# Patient Record
Sex: FEMALE | Race: ASIAN | NOT HISPANIC OR LATINO | URBAN - METROPOLITAN AREA
[De-identification: names, ages, dates, MRNs, and addresses within clinical notes are randomized per-mention and may not be internally consistent; named-entity substitution may affect disease eponyms.]

---

## 2020-04-21 VITALS
HEIGHT: 68 IN | RESPIRATION RATE: 18 BRPM | SYSTOLIC BLOOD PRESSURE: 107 MMHG | WEIGHT: 115.08 LBS | OXYGEN SATURATION: 97 % | HEART RATE: 80 BPM | TEMPERATURE: 98 F | DIASTOLIC BLOOD PRESSURE: 73 MMHG

## 2020-04-21 LAB
ALBUMIN SERPL ELPH-MCNC: 4.4 G/DL — SIGNIFICANT CHANGE UP (ref 3.4–5)
ALP SERPL-CCNC: 52 U/L — SIGNIFICANT CHANGE UP (ref 40–120)
ALT FLD-CCNC: 28 U/L — SIGNIFICANT CHANGE UP (ref 12–42)
ANION GAP SERPL CALC-SCNC: 12 MMOL/L — SIGNIFICANT CHANGE UP (ref 9–16)
APPEARANCE UR: CLEAR — SIGNIFICANT CHANGE UP
AST SERPL-CCNC: 22 U/L — SIGNIFICANT CHANGE UP (ref 15–37)
BASOPHILS # BLD AUTO: 0.02 K/UL — SIGNIFICANT CHANGE UP (ref 0–0.2)
BASOPHILS NFR BLD AUTO: 0.2 % — SIGNIFICANT CHANGE UP (ref 0–2)
BILIRUB SERPL-MCNC: 0.6 MG/DL — SIGNIFICANT CHANGE UP (ref 0.2–1.2)
BILIRUB UR-MCNC: NEGATIVE — SIGNIFICANT CHANGE UP
BUN SERPL-MCNC: 11 MG/DL — SIGNIFICANT CHANGE UP (ref 7–23)
CALCIUM SERPL-MCNC: 9.3 MG/DL — SIGNIFICANT CHANGE UP (ref 8.5–10.5)
CHLORIDE SERPL-SCNC: 103 MMOL/L — SIGNIFICANT CHANGE UP (ref 96–108)
CO2 SERPL-SCNC: 23 MMOL/L — SIGNIFICANT CHANGE UP (ref 22–31)
COLOR SPEC: YELLOW — SIGNIFICANT CHANGE UP
CREAT SERPL-MCNC: 1 MG/DL — SIGNIFICANT CHANGE UP (ref 0.5–1.3)
DIFF PNL FLD: NEGATIVE — SIGNIFICANT CHANGE UP
EOSINOPHIL # BLD AUTO: 0.01 K/UL — SIGNIFICANT CHANGE UP (ref 0–0.5)
EOSINOPHIL NFR BLD AUTO: 0.1 % — SIGNIFICANT CHANGE UP (ref 0–6)
GLUCOSE SERPL-MCNC: 143 MG/DL — HIGH (ref 70–99)
GLUCOSE UR QL: NEGATIVE — SIGNIFICANT CHANGE UP
HCG SERPL-ACNC: <1 MIU/ML — SIGNIFICANT CHANGE UP
HCT VFR BLD CALC: 40.1 % — SIGNIFICANT CHANGE UP (ref 34.5–45)
HGB BLD-MCNC: 14.3 G/DL — SIGNIFICANT CHANGE UP (ref 11.5–15.5)
IMM GRANULOCYTES NFR BLD AUTO: 0.5 % — SIGNIFICANT CHANGE UP (ref 0–1.5)
KETONES UR-MCNC: 15 MG/DL
LEUKOCYTE ESTERASE UR-ACNC: NEGATIVE — SIGNIFICANT CHANGE UP
LIDOCAIN IGE QN: 119 U/L — SIGNIFICANT CHANGE UP (ref 73–393)
LYMPHOCYTES # BLD AUTO: 1.19 K/UL — SIGNIFICANT CHANGE UP (ref 1–3.3)
LYMPHOCYTES # BLD AUTO: 13.7 % — SIGNIFICANT CHANGE UP (ref 13–44)
MCHC RBC-ENTMCNC: 31.6 PG — SIGNIFICANT CHANGE UP (ref 27–34)
MCHC RBC-ENTMCNC: 35.7 GM/DL — SIGNIFICANT CHANGE UP (ref 32–36)
MCV RBC AUTO: 88.5 FL — SIGNIFICANT CHANGE UP (ref 80–100)
MONOCYTES # BLD AUTO: 0.38 K/UL — SIGNIFICANT CHANGE UP (ref 0–0.9)
MONOCYTES NFR BLD AUTO: 4.4 % — SIGNIFICANT CHANGE UP (ref 2–14)
NEUTROPHILS # BLD AUTO: 7.06 K/UL — SIGNIFICANT CHANGE UP (ref 1.8–7.4)
NEUTROPHILS NFR BLD AUTO: 81.1 % — HIGH (ref 43–77)
NITRITE UR-MCNC: NEGATIVE — SIGNIFICANT CHANGE UP
NRBC # BLD: 0 /100 WBCS — SIGNIFICANT CHANGE UP (ref 0–0)
PH UR: 7 — SIGNIFICANT CHANGE UP (ref 5–8)
PLATELET # BLD AUTO: 154 K/UL — SIGNIFICANT CHANGE UP (ref 150–400)
POTASSIUM SERPL-MCNC: 3.4 MMOL/L — LOW (ref 3.5–5.3)
POTASSIUM SERPL-SCNC: 3.4 MMOL/L — LOW (ref 3.5–5.3)
PROT SERPL-MCNC: 7.6 G/DL — SIGNIFICANT CHANGE UP (ref 6.4–8.2)
PROT UR-MCNC: NEGATIVE MG/DL — SIGNIFICANT CHANGE UP
RBC # BLD: 4.53 M/UL — SIGNIFICANT CHANGE UP (ref 3.8–5.2)
RBC # FLD: 11.9 % — SIGNIFICANT CHANGE UP (ref 10.3–14.5)
SODIUM SERPL-SCNC: 138 MMOL/L — SIGNIFICANT CHANGE UP (ref 132–145)
SP GR SPEC: 1.02 — SIGNIFICANT CHANGE UP (ref 1–1.03)
UROBILINOGEN FLD QL: 0.2 E.U./DL — SIGNIFICANT CHANGE UP
WBC # BLD: 8.7 K/UL — SIGNIFICANT CHANGE UP (ref 3.8–10.5)
WBC # FLD AUTO: 8.7 K/UL — SIGNIFICANT CHANGE UP (ref 3.8–10.5)

## 2020-04-21 PROCEDURE — 76856 US EXAM PELVIC COMPLETE: CPT | Mod: 26

## 2020-04-21 PROCEDURE — 76830 TRANSVAGINAL US NON-OB: CPT | Mod: 26

## 2020-04-21 PROCEDURE — 74177 CT ABD & PELVIS W/CONTRAST: CPT | Mod: 26

## 2020-04-21 PROCEDURE — 99220: CPT

## 2020-04-21 PROCEDURE — 93010 ELECTROCARDIOGRAM REPORT: CPT

## 2020-04-21 RX ORDER — MORPHINE SULFATE 50 MG/1
4 CAPSULE, EXTENDED RELEASE ORAL ONCE
Refills: 0 | Status: DISCONTINUED | OUTPATIENT
Start: 2020-04-21 | End: 2020-04-21

## 2020-04-21 RX ORDER — POTASSIUM CHLORIDE 20 MEQ
20 PACKET (EA) ORAL ONCE
Refills: 0 | Status: COMPLETED | OUTPATIENT
Start: 2020-04-21 | End: 2020-04-21

## 2020-04-21 RX ORDER — MORPHINE SULFATE 50 MG/1
2 CAPSULE, EXTENDED RELEASE ORAL ONCE
Refills: 0 | Status: DISCONTINUED | OUTPATIENT
Start: 2020-04-21 | End: 2020-04-21

## 2020-04-21 RX ORDER — FAMOTIDINE 10 MG/ML
20 INJECTION INTRAVENOUS ONCE
Refills: 0 | Status: COMPLETED | OUTPATIENT
Start: 2020-04-21 | End: 2020-04-21

## 2020-04-21 RX ORDER — KETOROLAC TROMETHAMINE 30 MG/ML
30 SYRINGE (ML) INJECTION ONCE
Refills: 0 | Status: DISCONTINUED | OUTPATIENT
Start: 2020-04-21 | End: 2020-04-21

## 2020-04-21 RX ORDER — METOCLOPRAMIDE HCL 10 MG
10 TABLET ORAL ONCE
Refills: 0 | Status: COMPLETED | OUTPATIENT
Start: 2020-04-21 | End: 2020-04-21

## 2020-04-21 RX ORDER — IOHEXOL 300 MG/ML
30 INJECTION, SOLUTION INTRAVENOUS ONCE
Refills: 0 | Status: COMPLETED | OUTPATIENT
Start: 2020-04-21 | End: 2020-04-21

## 2020-04-21 RX ORDER — SODIUM CHLORIDE 9 MG/ML
1000 INJECTION INTRAMUSCULAR; INTRAVENOUS; SUBCUTANEOUS ONCE
Refills: 0 | Status: COMPLETED | OUTPATIENT
Start: 2020-04-21 | End: 2020-04-21

## 2020-04-21 RX ORDER — PANTOPRAZOLE SODIUM 20 MG/1
40 TABLET, DELAYED RELEASE ORAL ONCE
Refills: 0 | Status: COMPLETED | OUTPATIENT
Start: 2020-04-21 | End: 2020-04-21

## 2020-04-21 RX ORDER — SODIUM CHLORIDE 9 MG/ML
1000 INJECTION, SOLUTION INTRAVENOUS
Refills: 0 | Status: DISCONTINUED | OUTPATIENT
Start: 2020-04-21 | End: 2020-04-22

## 2020-04-21 RX ORDER — HYDROMORPHONE HYDROCHLORIDE 2 MG/ML
0.5 INJECTION INTRAMUSCULAR; INTRAVENOUS; SUBCUTANEOUS ONCE
Refills: 0 | Status: DISCONTINUED | OUTPATIENT
Start: 2020-04-21 | End: 2020-04-21

## 2020-04-21 RX ADMIN — Medication 30 MILLIGRAM(S): at 20:59

## 2020-04-21 RX ADMIN — MORPHINE SULFATE 4 MILLIGRAM(S): 50 CAPSULE, EXTENDED RELEASE ORAL at 19:34

## 2020-04-21 RX ADMIN — PANTOPRAZOLE SODIUM 40 MILLIGRAM(S): 20 TABLET, DELAYED RELEASE ORAL at 23:37

## 2020-04-21 RX ADMIN — FAMOTIDINE 20 MILLIGRAM(S): 10 INJECTION INTRAVENOUS at 19:56

## 2020-04-21 RX ADMIN — HYDROMORPHONE HYDROCHLORIDE 0.5 MILLIGRAM(S): 2 INJECTION INTRAMUSCULAR; INTRAVENOUS; SUBCUTANEOUS at 23:37

## 2020-04-21 RX ADMIN — IOHEXOL 30 MILLILITER(S): 300 INJECTION, SOLUTION INTRAVENOUS at 23:36

## 2020-04-21 RX ADMIN — Medication 10 MILLIGRAM(S): at 23:37

## 2020-04-21 RX ADMIN — MORPHINE SULFATE 2 MILLIGRAM(S): 50 CAPSULE, EXTENDED RELEASE ORAL at 19:59

## 2020-04-21 RX ADMIN — Medication 20 MILLIEQUIVALENT(S): at 21:22

## 2020-04-21 RX ADMIN — SODIUM CHLORIDE 1000 MILLILITER(S): 9 INJECTION INTRAMUSCULAR; INTRAVENOUS; SUBCUTANEOUS at 19:55

## 2020-04-21 NOTE — ED CDU PROVIDER INITIAL DAY NOTE - PROGRESS NOTE DETAILS
CT A/P with non specific finding of possible early SBO vs ileus, with gastritis/colitis, pt reassessed at bedside, abd soft with TTP in the mid periumbilical region, will keep in obs for pain control, IVF, bowel rest, serial abd exam, repeat CT with oral contrast in the ED given risks of nosocomial exposure during COVID pandemic for inpt hospitalization and intervention.  Pt agrees with plan pain improved, pt tolerated po without N/V, +flatus and BM, low suspicion for SBO, s/p oral contrast, will rescan pt to r/o SBO CT A/P with non specific finding of possible early SBO vs ileus, with gastritis/colitis, pt reassessed at bedside, abd soft with TTP in the periumbilical and RLQ region, will keep in obs for pain control, IVF, bowel rest, serial abd exam, repeat CT with oral contrast in the ED given risks of nosocomial exposure during COVID pandemic for inpt hospitalization and intervention.  Pt agrees with plan pain improved, pt tolerated po without N/V, +flatus, last BM yesterday prior to arrival to ER, will rescan w oral contrast and continue current management repeat CT with oral contrast reveals SBO w wall edema and transition point around RLQ.  abd remains soft with TTP over periumbilical and RLQ region.  +flatus, no N/V, declined NGT at current time given pain under control without N/V. Will obtain COVID swab and admit to [facility] for further management

## 2020-04-21 NOTE — ED PROVIDER NOTE - PROGRESS NOTE DETAILS
patient feeling better however still c/o lower abdominal pain with mild periumbilical pain, pelvic neg for ovarian torsion, labs unremarkable, +mild lower abdominal on repeat abdominal examination, will order CT Abdomen with IV contrast and sign out to overnight team CT A/P with non specific finding of possible early SBO vs ileus, with gastritis/colitis, pt reassessed at bedside, abd soft with TTP in the mid periumbilical region, will keep in obs for pain control, IVF, bowel rest, repeat CT with oral contrast given risks of nosocomial exposure during COVID pandemic.  Pt agrees with plan

## 2020-04-21 NOTE — ED ADULT NURSE NOTE - OBJECTIVE STATEMENT
Pt presents to ED c/o sudden onset lower abdominal pain with N/V x2 hours PTA. Pt upgraded due to pain. Denies any fever/chills.

## 2020-04-21 NOTE — ED ADULT TRIAGE NOTE - CHIEF COMPLAINT QUOTE
Patient to ED with complaint of lower abdominal pain, nausea, vomiting X 2 hours.  LMP Easter Sunday.  Appears un comfortable

## 2020-04-21 NOTE — ED PROVIDER NOTE - CLINICAL SUMMARY MEDICAL DECISION MAKING FREE TEXT BOX
acute onset of lower abdominal pain x 2 hours ago with vomiting after eating eggs with vegetables, appears uncomfortable in pain upon initial evaluation, normal vitals, +right lower abd tenderness, basic labs, upreg, pelvic US r/o ovarian torsion ordered, analgesia prn, IVF, UA. consider CT abdomen if pelvic US unremarkable.

## 2020-04-21 NOTE — ED CDU PROVIDER INITIAL DAY NOTE - OBJECTIVE STATEMENT
45 yo female with no sig pmhx presents c/o acute onset generalized abdominal pain described as severe cramps x 2 hours, poorly localized with associated nausea and 2 episodes of NBNB occurred 30 minutes after eating eggs with vegetables. no diarrhea/fever/cough/chest pain or dyspnea. no flank pain or urinary symptoms. no vaginal discharge. LMP 1 week ago. Sexually active with 1 partner x 3 years, no hx STDs.

## 2020-04-21 NOTE — ED PROVIDER NOTE - PHYSICAL EXAMINATION
CONSTITUTIONAL: Well-developed; well-nourished; appears uncomfortable  due to pain  	SKIN: Skin is warm and dry, no acute rash.  	HEAD: Normocephalic; atraumatic.  	EYES: PERRL, EOM intact; conjunctiva and sclera clear.  	ENT: No nasal discharge; airway clear.  no tonsillar swelling or exudates, uvula midline, airway patent  	NECK: Supple; non tender.  	CARD: S1, S2 normal; no murmurs, gallops, or rubs. Regular rate and rhythm.  	RESP: No wheezes, rales or rhonchi.  	ABD: soft; non-distended; +lower abdominal tenderness. no guarding or rebound. no cvat bilaterally  Pelvic: no vag discharge or lesions. +mild right adnexal tenderness, no CMT or masses palpated.   	EXT: Normal ROM. No clubbing, cyanosis or edema.  	NEURO: Alert, oriented. Grossly unremarkable.  PSYCH: Cooperative, appropriate.

## 2020-04-21 NOTE — ED PROVIDER NOTE - OBJECTIVE STATEMENT
45 yo female with no sig pmhx presents c/o acute onset generalized abdominal pain described as severe cramps x 2 hours, poorly localized with associated nausea and 2 episodes of NBNB occurred 30 minutes after eating eggs with vegetables. no diarrhea/fever/cough/chest pain or dyspnea. no flank pain or urinary symptoms. no vaginal discharge. LMP 1 week ago. 43 yo female with no sig pmhx presents c/o acute onset generalized abdominal pain described as severe cramps x 2 hours, poorly localized with associated nausea and 2 episodes of NBNB occurred 30 minutes after eating eggs with vegetables. no diarrhea/fever/cough/chest pain or dyspnea. no flank pain or urinary symptoms. no vaginal discharge. LMP 1 week ago. Sexually active with 1 partner x 3 years, no hx STDs.

## 2020-04-21 NOTE — ED PROVIDER NOTE - ATTENDING CONTRIBUTION TO CARE
pt was seen by previous team, s/o to night team pending US and CT, and found to have possible sbo vs ileus.  given history and lack of prior abd surg, more c/w ?viral ileus vs sbo, will PO challenge overnight and consider rpt CT in the morning, reassess

## 2020-04-22 ENCOUNTER — INPATIENT (INPATIENT)
Facility: HOSPITAL | Age: 45
LOS: 1 days | Discharge: HOME | DRG: 336 | End: 2020-04-24
Attending: SURGERY | Admitting: SURGERY
Payer: MEDICAID

## 2020-04-22 LAB
ANION GAP SERPL CALC-SCNC: 12 MMOL/L — SIGNIFICANT CHANGE UP (ref 5–17)
APTT BLD: 30.5 SEC — SIGNIFICANT CHANGE UP (ref 27.5–36.3)
BUN SERPL-MCNC: 8 MG/DL — SIGNIFICANT CHANGE UP (ref 7–23)
CALCIUM SERPL-MCNC: 8.4 MG/DL — SIGNIFICANT CHANGE UP (ref 8.4–10.5)
CHLORIDE SERPL-SCNC: 102 MMOL/L — SIGNIFICANT CHANGE UP (ref 96–108)
CO2 SERPL-SCNC: 19 MMOL/L — LOW (ref 22–31)
CREAT SERPL-MCNC: 0.64 MG/DL — SIGNIFICANT CHANGE UP (ref 0.5–1.3)
GLUCOSE SERPL-MCNC: 152 MG/DL — HIGH (ref 70–99)
GRAM STN FLD: SIGNIFICANT CHANGE UP
HCT VFR BLD CALC: 37.1 % — SIGNIFICANT CHANGE UP (ref 34.5–45)
HGB BLD-MCNC: 13.1 G/DL — SIGNIFICANT CHANGE UP (ref 11.5–15.5)
INR BLD: 1.03 — SIGNIFICANT CHANGE UP (ref 0.88–1.16)
LACTATE SERPL-SCNC: 0.8 MMOL/L — SIGNIFICANT CHANGE UP (ref 0.4–2)
MAGNESIUM SERPL-MCNC: 1.9 MG/DL — SIGNIFICANT CHANGE UP (ref 1.6–2.6)
MCHC RBC-ENTMCNC: 31.6 PG — SIGNIFICANT CHANGE UP (ref 27–34)
MCHC RBC-ENTMCNC: 35.3 GM/DL — SIGNIFICANT CHANGE UP (ref 32–36)
MCV RBC AUTO: 89.4 FL — SIGNIFICANT CHANGE UP (ref 80–100)
NRBC # BLD: 0 /100 WBCS — SIGNIFICANT CHANGE UP (ref 0–0)
PHOSPHATE SERPL-MCNC: 1.8 MG/DL — LOW (ref 2.5–4.5)
PLATELET # BLD AUTO: 132 K/UL — LOW (ref 150–400)
POTASSIUM SERPL-MCNC: 3.7 MMOL/L — SIGNIFICANT CHANGE UP (ref 3.5–5.3)
POTASSIUM SERPL-SCNC: 3.7 MMOL/L — SIGNIFICANT CHANGE UP (ref 3.5–5.3)
PROTHROM AB SERPL-ACNC: 11.5 SEC — SIGNIFICANT CHANGE UP (ref 10–12.9)
RBC # BLD: 4.15 M/UL — SIGNIFICANT CHANGE UP (ref 3.8–5.2)
RBC # FLD: 11.5 % — SIGNIFICANT CHANGE UP (ref 10.3–14.5)
SARS-COV-2 RNA SPEC QL NAA+PROBE: SIGNIFICANT CHANGE UP
SODIUM SERPL-SCNC: 133 MMOL/L — LOW (ref 135–145)
SPECIMEN SOURCE: SIGNIFICANT CHANGE UP
WBC # BLD: 8.69 K/UL — SIGNIFICANT CHANGE UP (ref 3.8–10.5)
WBC # FLD AUTO: 8.69 K/UL — SIGNIFICANT CHANGE UP (ref 3.8–10.5)

## 2020-04-22 PROCEDURE — 99217: CPT

## 2020-04-22 PROCEDURE — 88304 TISSUE EXAM BY PATHOLOGIST: CPT | Mod: 26

## 2020-04-22 PROCEDURE — 74176 CT ABD & PELVIS W/O CONTRAST: CPT | Mod: 26

## 2020-04-22 PROCEDURE — 74018 RADEX ABDOMEN 1 VIEW: CPT | Mod: 26

## 2020-04-22 RX ORDER — HYDROMORPHONE HYDROCHLORIDE 2 MG/ML
0.25 INJECTION INTRAMUSCULAR; INTRAVENOUS; SUBCUTANEOUS EVERY 4 HOURS
Refills: 0 | Status: DISCONTINUED | OUTPATIENT
Start: 2020-04-22 | End: 2020-04-24

## 2020-04-22 RX ORDER — HEPARIN SODIUM 5000 [USP'U]/ML
5000 INJECTION INTRAVENOUS; SUBCUTANEOUS EVERY 8 HOURS
Refills: 0 | Status: DISCONTINUED | OUTPATIENT
Start: 2020-04-22 | End: 2020-04-24

## 2020-04-22 RX ORDER — MAGNESIUM SULFATE 500 MG/ML
1 VIAL (ML) INJECTION ONCE
Refills: 0 | Status: COMPLETED | OUTPATIENT
Start: 2020-04-22 | End: 2020-04-22

## 2020-04-22 RX ORDER — HYDROMORPHONE HYDROCHLORIDE 2 MG/ML
0.5 INJECTION INTRAMUSCULAR; INTRAVENOUS; SUBCUTANEOUS EVERY 4 HOURS
Refills: 0 | Status: DISCONTINUED | OUTPATIENT
Start: 2020-04-22 | End: 2020-04-24

## 2020-04-22 RX ORDER — HYDROMORPHONE HYDROCHLORIDE 2 MG/ML
0.25 INJECTION INTRAMUSCULAR; INTRAVENOUS; SUBCUTANEOUS
Refills: 0 | Status: DISCONTINUED | OUTPATIENT
Start: 2020-04-22 | End: 2020-04-22

## 2020-04-22 RX ORDER — ONDANSETRON 8 MG/1
4 TABLET, FILM COATED ORAL ONCE
Refills: 0 | Status: COMPLETED | OUTPATIENT
Start: 2020-04-22 | End: 2020-04-22

## 2020-04-22 RX ORDER — ACETAMINOPHEN 500 MG
1000 TABLET ORAL ONCE
Refills: 0 | Status: COMPLETED | OUTPATIENT
Start: 2020-04-22 | End: 2020-04-22

## 2020-04-22 RX ORDER — POTASSIUM PHOSPHATE, MONOBASIC POTASSIUM PHOSPHATE, DIBASIC 236; 224 MG/ML; MG/ML
15 INJECTION, SOLUTION INTRAVENOUS ONCE
Refills: 0 | Status: COMPLETED | OUTPATIENT
Start: 2020-04-22 | End: 2020-04-22

## 2020-04-22 RX ORDER — ONDANSETRON 8 MG/1
4 TABLET, FILM COATED ORAL EVERY 6 HOURS
Refills: 0 | Status: DISCONTINUED | OUTPATIENT
Start: 2020-04-22 | End: 2020-04-24

## 2020-04-22 RX ORDER — DEXTROSE MONOHYDRATE, SODIUM CHLORIDE, AND POTASSIUM CHLORIDE 50; .745; 4.5 G/1000ML; G/1000ML; G/1000ML
1000 INJECTION, SOLUTION INTRAVENOUS
Refills: 0 | Status: DISCONTINUED | OUTPATIENT
Start: 2020-04-22 | End: 2020-04-24

## 2020-04-22 RX ADMIN — Medication 400 MILLIGRAM(S): at 09:00

## 2020-04-22 RX ADMIN — ONDANSETRON 4 MILLIGRAM(S): 8 TABLET, FILM COATED ORAL at 04:46

## 2020-04-22 RX ADMIN — Medication 100 GRAM(S): at 10:57

## 2020-04-22 RX ADMIN — Medication 62.5 MILLIMOLE(S): at 18:00

## 2020-04-22 RX ADMIN — ONDANSETRON 4 MILLIGRAM(S): 8 TABLET, FILM COATED ORAL at 09:19

## 2020-04-22 RX ADMIN — DEXTROSE MONOHYDRATE, SODIUM CHLORIDE, AND POTASSIUM CHLORIDE 125 MILLILITER(S): 50; .745; 4.5 INJECTION, SOLUTION INTRAVENOUS at 17:04

## 2020-04-22 RX ADMIN — SODIUM CHLORIDE 1000 MILLILITER(S): 9 INJECTION, SOLUTION INTRAVENOUS at 04:02

## 2020-04-22 RX ADMIN — DEXTROSE MONOHYDRATE, SODIUM CHLORIDE, AND POTASSIUM CHLORIDE 125 MILLILITER(S): 50; .745; 4.5 INJECTION, SOLUTION INTRAVENOUS at 04:13

## 2020-04-22 RX ADMIN — HEPARIN SODIUM 5000 UNIT(S): 5000 INJECTION INTRAVENOUS; SUBCUTANEOUS at 23:00

## 2020-04-22 RX ADMIN — POTASSIUM PHOSPHATE, MONOBASIC POTASSIUM PHOSPHATE, DIBASIC 63.75 MILLIMOLE(S): 236; 224 INJECTION, SOLUTION INTRAVENOUS at 12:01

## 2020-04-22 RX ADMIN — SODIUM CHLORIDE 200 MILLILITER(S): 9 INJECTION, SOLUTION INTRAVENOUS at 00:03

## 2020-04-22 NOTE — PROGRESS NOTE ADULT - SUBJECTIVE AND OBJECTIVE BOX
Vascular Surgery Post-Op Note, PCN:     Pre-Op Dx: ABDOMINAL PAIN  Small bowel obstruction  SBO (small bowel obstruction)  Abdominal pain    Procedure: Laparoscopic lysis of intestinal adhesions      Surgeon: Dr. Harley    Patient was seen and examined at bedside. No complaints. Denies SOB, chest or abdominal pain. No n/v.         T(C): 37 (20 @ 16:15), Max: 37 (20 @ 16:15)  HR: 69 (20 @ 18:00) (60 - 78)  BP: 106/59 (20 @ 18:00) (97/61 - 128/84)  RR: 16 (20 @ 18:00) (12 - 19)  SpO2: 100% (20 @ 18:00) (96% - 100%)  Vital Signs Last 24 Hrs  T(C): 37 (2020 16:15), Max: 37 (2020 16:15)  T(F): 98.6 (2020 16:15), Max: 98.6 (2020 16:15)  HR: 69 (2020 18:00) (60 - 78)  BP: 106/59 (2020 18:00) (97/61 - 128/84)  BP(mean): 81 (2020 18:00) (66 - 87)  RR: 16 (2020 18:00) (12 - 19)  SpO2: 100% (2020 18:00) (96% - 100%)    Physical Exam:  General: NAD, resting comfortably in bed  Pulmonary: Nonlabored breathing, no respiratory distress  Cardiovascular: NSR  Abdominal: soft, NT/ND incision sites with no signs of hematoma or bleeding. No discharge.   Extremities: WWP, normal strength  Neuro: A/O x 3, CNs II-XII grossly intact, normal motor/sensation, no focal deficits      LABS:                        13.1   8.69  )-----------( 132      ( 2020 09:37 )             37.1     -    133<L>  |  102  |  8   ----------------------------<  152<H>  3.7   |  19<L>  |  0.64    Ca    8.4      2020 09:37  Phos  1.8     04-22  Mg     1.9     -22    TPro  7.6  /  Alb  4.4  /  TBili  0.6  /  DBili  x   /  AST  22  /  ALT  28  /  AlkPhos  52  04-21    PT/INR - ( 2020 04:13 )   PT: 11.5 sec;   INR: 1.03          PTT - ( 2020 04:13 )  PTT:30.5 sec  CAPILLARY BLOOD GLUCOSE        LIVER FUNCTIONS - ( 2020 19:44 )  Alb: 4.4 g/dL / Pro: 7.6 g/dL / ALK PHOS: 52 U/L / ALT: 28 U/L / AST: 22 U/L / GGT: x           Urinalysis Basic - ( 2020 21:10 )    Color: Yellow / Appearance: Clear / S.025 / pH: x  Gluc: x / Ketone: 15 mg/dL  / Bili: NEGATIVE / Urobili: 0.2 E.U./dL   Blood: x / Protein: NEGATIVE mg/dL / Nitrite: NEGATIVE   Leuk Esterase: NEGATIVE / RBC: x / WBC x   Sq Epi: x / Non Sq Epi: x / Bacteria: x        Radiology and Additional Studies:    Assessment:44y Female s/p above procedure    Plan:  Pain/nausea control PRN  Home meds  Incentive spirometer/OOB/Ambulate  Vitals per protocol  IVF   TOV  AM labs  NG tube

## 2020-04-22 NOTE — H&P ADULT - NSHPLABSRESULTS_GEN_ALL_CORE
LABS:                        14.3   8.70  )-----------( 154      ( 2020 19:44 )             40.1     -    138  |  103  |  11  ----------------------------<  143<H>  3.4<L>   |  23  |  1.00    Ca    9.3      2020 19:44    TPro  7.6  /  Alb  4.4  /  TBili  0.6  /  DBili  x   /  AST  22  /  ALT  28  /  AlkPhos  52  04-21    PT/INR - ( 2020 04:13 )   PT: 11.5 sec;   INR: 1.03          PTT - ( 2020 04:13 )  PTT:30.5 sec  Urinalysis Basic - ( 2020 21:10 )    Color: Yellow / Appearance: Clear / S.025 / pH: x  Gluc: x / Ketone: 15 mg/dL  / Bili: NEGATIVE / Urobili: 0.2 E.U./dL   Blood: x / Protein: NEGATIVE mg/dL / Nitrite: NEGATIVE   Leuk Esterase: NEGATIVE / RBC: x / WBC x   Sq Epi: x / Non Sq Epi: x / Bacteria: x        RADIOLOGY & ADDITIONAL STUDIES:  CT Abdomen and Pelvis w/ IV Cont:   EXAM:  CT ABDOMEN AND PELVIS IC                           PROCEDURE DATE:  2020          INTERPRETATION:  CT of the abdomen and pelvis with contrast dated 2020 10:52 PM    INDICATION: lower abdominal pain, nausea and vomiting.    TECHNIQUE: CT of the abdomen and pelvis was performed using oral and intravenous contrast.  Axial and coronal images were produced and reviewed.    PRIOR STUDIES: None.    FINDINGS: Images of the lower chest demonstrate dependent atelectasis.    Subcentimeter hepatic hypodensities, too small to characterize. No radiopaque stones are seen in the gallbladder.  The pancreas is normal in appearance.  No splenic abnormalities are seen.    The adrenal glands are unremarkable. The kidneys are normal in appearance.        No abdominal aortic aneurysm is seen. No lymphadenopathy is seen.     Evaluation of the bowel is somewhat limited due to lack of oral contrast. Gastric antrum wall thickening suspicious for gastritis. Underdistention versus wall thickening from the hepatic flexure to the distal transverse colon consistent with mild nonspecific colitis of infectious or inflammatory etiologies. Normal appendix. Focally dilated loops of small bowel in the pelvis with air-fluid levels measuring up to 2.8 cmdemonstrating fecalization. There is suggestion of a transition point in the distal small bowel image 27 series 601. No ascites is seen.    Images of the pelvis demonstrate the uterus and adnexae to be normal in appearance.  Underdistention versus wall thickening of the urinary bladder, correlation with urinalysis is recommended.    Evaluation of the osseous structures demonstrates limbus vertebrae at the anteroinferior corner of L3 vertebral body. Mild posterior arthritis of the hips with possible mild dysplasia.    IMPRESSION:  Focally dilated loops of distal small bowel in the pelvis with fecalization and suggestion of a transition point. Findings could represent early distal small bowel obstruction versus localized small bowel ileus. Recommend a follow-up CT with oral contrast in a few hours.    Wall thickening of the gastric antrum and transverse colon suspicious for gastritis and colitis which may be further evaluated at the time of repeat CT with oral contrast.                Thankyou for the opportunity to participate in the care of this patient.        MIGUEL GARCIA M.D., ATTENDING RADIOLOGIST  This document has been electronically signed.  2020 10:53PM               (20 @ 22:52)

## 2020-04-22 NOTE — H&P ADULT - ASSESSMENT
44F w/ no PSHx p/w abdominal pain 2/2 ileus v parial SBO    NPO/IVF  Pain/nausea control  OOBA/IS/SCDs 44F w/ no PSHx p/w abdominal pain 2/2 ileus v parial SBO    NPO/IVF  Pain/nausea control  OOBA/IS/SCDs  Will discuss OR for dx lap 44F w/ no PSHx p/w abdominal pain 2/2 ileus v SBO of unknown etiology    NPO/IVF  Pain/nausea control  OOBA/IS/SCDs  Will discuss OR for dx lap

## 2020-04-22 NOTE — ED CDU PROVIDER DISPOSITION NOTE - CLINICAL COURSE
43 yo female with no sig pmhx or pshx presents c/o acute onset generalized abdominal pain described as severe cramps x 2 hours, poorly localized with associated nausea and 2 episodes of NBNB occurred 30 minutes after eating eggs with vegetables. no diarrhea/fever/cough/chest pain or dyspnea. no flank pain or urinary symptoms. no vaginal discharge. LMP 1 week ago. Sexually active with 1 partner x 3 years, no hx STDs. labs unremarkable, serial abd exam with persistent pain, TVUS/pelvis unremarkable, CT with IV contrast and later repeated with PO contrast w SBO, no oral contrast has not progressed into the region of the distal small bowel, there is again distended distal ileal bowel loops within the lower abdomen and right lower quadrant. Probable transition point within the region of the right lower quadrant. Findings are consistent with distal small bowel obstruction. Pt has been kept NPO, requiring multiple doses of IV analgesic meds, recommended NGT but currently refused due to no active N/V.  Pt reports no prior h/o EGD/C-scopy.  Case discussed with ACS on call, Dr. Santamaria, accepted to regional surgery at [facility]

## 2020-04-22 NOTE — CHART NOTE - NSCHARTNOTEFT_GEN_A_CORE
Called to bedside as patient stated her abdominal pain was worsening. Upon arrival I was told her pain is worse and she is nauseous. Patient began to force herself to gag into her bucket. Discussion was had about NG tube placement and patient was not amenable to this intervention even though the benefits were clearly explained to her. She stated she has been having so much pain that she has not been able to sleep and if she could sleep she would feel better. She also states she isn't passing flatus. Abdominal exam is unchanged, she is soft, minimally distended and nontender.

## 2020-04-22 NOTE — ED ADULT NURSE REASSESSMENT NOTE - NS ED NURSE REASSESS COMMENT FT1
Pt reports some relief in pain s/p medication administration. Pt drinking oral contrast for repeat CT. Pt remains in bed, in NAD, vital signs WNL. Will continue to monitor.
Pt sleeping on stretcher. Pt states she finished drinking OC at approx 12:30am. Pt awaiting CT scan with contrast. Will continue to monitor. Side rails up, safety is maintained.

## 2020-04-22 NOTE — H&P ADULT - HISTORY OF PRESENT ILLNESS
44F w/ no PMHx p/w 1 day of diffuse abdominal pain with associated nausea/vomiting, felt better after emesis, endorses BM yesterday and passing gas en route to the hospital.

## 2020-04-22 NOTE — H&P ADULT - NSCORESITESY/N_GEN_A_CORE_RD
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 Axel Salinas.     The pt is need of having the spleenectomy protocol for vaccinations.  The pt no longer resides at [facility].     Per Dr. Khan office recommends vaccinations to be done 3-4 weeks after splenectomy (8-16-19).    Splenectomy vaccines  -Give 3-4 weeks after surgery at PCP office or prepare department  -Covers encapsulated organisms: Pneumococcal, Haemophilus Influenza Type B and Meningococcal:    Pneumococcal (PCV13 & Pneumovax 23)  -Give PCV13 first if none prior, only one lifetime dose needed  -If patient has had or was given PCV13, and is due for Pneumovax 23, give the Pneumovax 23 at least 8 weeks after PCV13. Do not give PCV13 and Pneumovax 23 at the same time.   -If the patient recently had the pneumovax 23 but not the PCV 13 in their lifetime, give PCV 13 at least one year after last pneumovax 23  -Pneumovax 23 booster every 5 years max of 2 doses below age 65 for those >18 y/o  -Only need one dose of Pneumovax 23 after age 65 and max of 3 lifetime doses.     Haemophilus Influenza type B (Hib)   -One dose in a lifetime, do not give if already received    Meningococcal (MCV4 Menveo & MenB series)  -MCV4 Menveo one dose now and another dose in 8 weeks if no two dose series prior.   -Will need a booster of MCV4 Menveo every 5 years with PCP office.  -MenB Bexsero Vaccine 2 dose series, 4 weeks a part if no prior 2 dose series, no boosters needed    -In addition patient should have yearly influenza flu vaccine during the flu season.     Pt received pneumonococcal 23 on 10- and pneumovac 13 on 5-31-16    RN called over to Dr. Axel Salinas's office to see if they would be able to order the necessary immunizations to allow the pt to get her vaccinations at [facility] in [place] since pt lives in [place] and would make it easier since they are on the same system and Dr. Salinas performed the splenectomy on  8-16-19.    Per Dr. Khan office they don't ever do the orders for the vaccinations, only send out recommendations to the PCP. She will send a copy to the [facility] to have as a reference.    Please advise on vaccinations. Will need orders placed, signed and faxed over to art in [place]. Please send back to [place].    Thanks.                  No

## 2020-04-22 NOTE — H&P ADULT - NSHPPHYSICALEXAM_GEN_ALL_CORE
Vital Signs Last 24 Hrs  T(C): 36.9 (22 Apr 2020 07:01), Max: 36.9 (22 Apr 2020 01:32)  T(F): 98.5 (22 Apr 2020 07:01), Max: 98.5 (22 Apr 2020 01:32)  HR: 73 (22 Apr 2020 07:01) (60 - 80)  BP: 128/84 (22 Apr 2020 07:01) (102/61 - 128/84)  BP(mean): --  RR: 12 (22 Apr 2020 07:01) (12 - 18)  SpO2: 99% (22 Apr 2020 07:01) (96% - 99%)    General: NAD, resting comfortably in bed  C/V: NSR  Pulm: Nonlabored breathing, no respiratory distress  Abd: softly distended, nontender, no rebound/gaurding  Extrem: WWP, no edema

## 2020-04-22 NOTE — BRIEF OPERATIVE NOTE - OPERATION/FINDINGS
Diagnostic laparoscopy performed revealing single culprit adhesion in pelvis w/ dilated loops of bowel proximally, decompressed loops distally, and hyperemic bowel trapped by adhesion. Adhesion lysed w/ immediate improvement in caliber and color of bowel. No other adhesions identified. Fascia at 12mm port site closed w/ 0-Vicryl suture.

## 2020-04-23 LAB
ANION GAP SERPL CALC-SCNC: 10 MMOL/L — SIGNIFICANT CHANGE UP (ref 5–17)
BUN SERPL-MCNC: 4 MG/DL — LOW (ref 7–23)
CALCIUM SERPL-MCNC: 8.3 MG/DL — LOW (ref 8.4–10.5)
CHLORIDE SERPL-SCNC: 106 MMOL/L — SIGNIFICANT CHANGE UP (ref 96–108)
CO2 SERPL-SCNC: 23 MMOL/L — SIGNIFICANT CHANGE UP (ref 22–31)
CREAT SERPL-MCNC: 0.67 MG/DL — SIGNIFICANT CHANGE UP (ref 0.5–1.3)
GLUCOSE SERPL-MCNC: 112 MG/DL — HIGH (ref 70–99)
HCT VFR BLD CALC: 38.8 % — SIGNIFICANT CHANGE UP (ref 34.5–45)
HGB BLD-MCNC: 13 G/DL — SIGNIFICANT CHANGE UP (ref 11.5–15.5)
MAGNESIUM SERPL-MCNC: 2.1 MG/DL — SIGNIFICANT CHANGE UP (ref 1.6–2.6)
MCHC RBC-ENTMCNC: 31.2 PG — SIGNIFICANT CHANGE UP (ref 27–34)
MCHC RBC-ENTMCNC: 33.5 GM/DL — SIGNIFICANT CHANGE UP (ref 32–36)
MCV RBC AUTO: 93 FL — SIGNIFICANT CHANGE UP (ref 80–100)
NRBC # BLD: 0 /100 WBCS — SIGNIFICANT CHANGE UP (ref 0–0)
PHOSPHATE SERPL-MCNC: 2.9 MG/DL — SIGNIFICANT CHANGE UP (ref 2.5–4.5)
PLATELET # BLD AUTO: 122 K/UL — LOW (ref 150–400)
POTASSIUM SERPL-MCNC: 3.8 MMOL/L — SIGNIFICANT CHANGE UP (ref 3.5–5.3)
POTASSIUM SERPL-SCNC: 3.8 MMOL/L — SIGNIFICANT CHANGE UP (ref 3.5–5.3)
RBC # BLD: 4.17 M/UL — SIGNIFICANT CHANGE UP (ref 3.8–5.2)
RBC # FLD: 11.8 % — SIGNIFICANT CHANGE UP (ref 10.3–14.5)
SODIUM SERPL-SCNC: 139 MMOL/L — SIGNIFICANT CHANGE UP (ref 135–145)
WBC # BLD: 5.47 K/UL — SIGNIFICANT CHANGE UP (ref 3.8–10.5)
WBC # FLD AUTO: 5.47 K/UL — SIGNIFICANT CHANGE UP (ref 3.8–10.5)

## 2020-04-23 RX ORDER — BENZOCAINE AND MENTHOL 5; 1 G/100ML; G/100ML
1 LIQUID ORAL THREE TIMES A DAY
Refills: 0 | Status: DISCONTINUED | OUTPATIENT
Start: 2020-04-23 | End: 2020-04-24

## 2020-04-23 RX ORDER — POTASSIUM CHLORIDE 20 MEQ
10 PACKET (EA) ORAL
Refills: 0 | Status: COMPLETED | OUTPATIENT
Start: 2020-04-23 | End: 2020-04-23

## 2020-04-23 RX ADMIN — Medication 100 MILLIEQUIVALENT(S): at 09:48

## 2020-04-23 RX ADMIN — BENZOCAINE AND MENTHOL 1 LOZENGE: 5; 1 LIQUID ORAL at 09:48

## 2020-04-23 RX ADMIN — DEXTROSE MONOHYDRATE, SODIUM CHLORIDE, AND POTASSIUM CHLORIDE 125 MILLILITER(S): 50; .745; 4.5 INJECTION, SOLUTION INTRAVENOUS at 06:03

## 2020-04-23 RX ADMIN — HEPARIN SODIUM 5000 UNIT(S): 5000 INJECTION INTRAVENOUS; SUBCUTANEOUS at 22:17

## 2020-04-23 RX ADMIN — BENZOCAINE AND MENTHOL 1 LOZENGE: 5; 1 LIQUID ORAL at 15:04

## 2020-04-23 RX ADMIN — Medication 100 MILLIEQUIVALENT(S): at 11:07

## 2020-04-23 RX ADMIN — HEPARIN SODIUM 5000 UNIT(S): 5000 INJECTION INTRAVENOUS; SUBCUTANEOUS at 15:04

## 2020-04-23 RX ADMIN — HEPARIN SODIUM 5000 UNIT(S): 5000 INJECTION INTRAVENOUS; SUBCUTANEOUS at 06:15

## 2020-04-23 RX ADMIN — BENZOCAINE AND MENTHOL 1 LOZENGE: 5; 1 LIQUID ORAL at 22:11

## 2020-04-23 NOTE — CHART NOTE - NSCHARTNOTEFT_GEN_A_CORE
Upon Nutritional Assessment by the Registered Dietitian your patient was determined to meet criteria / has evidence of the following diagnosis/diagnoses:          [ ]  Mild Protein Calorie Malnutrition        [ ]  Moderate Protein Calorie Malnutrition        [ ] Severe Protein Calorie Malnutrition        [ ] Unspecified Protein Calorie Malnutrition        [ x ] Underweight / BMI <19 (BMI 17.6kg/m2)        [ ] Morbid Obesity / BMI > 40      Findings as based on:  •  Comprehensive nutrition assessment and consultation  •  Calorie counts (nutrient intake analysis)  •  Food acceptance and intake status from observations by staff  •  Follow up  •  Patient education  •  Intervention secondary to interdisciplinary rounds  •   concerns      Treatment:    The following diet has been recommended:  1. NPO diet  2. Recommend advance to low fiber diet when medically feasible  3. Cont to manage pain/ nausea per team     PROVIDER Section:     By signing this assessment you are acknowledging and agree with the diagnosis/diagnoses assigned by the Registered Dietitian    Comments:

## 2020-04-23 NOTE — PROGRESS NOTE ADULT - ASSESSMENT
44F w/ no PSHx p/w abdominal pain 2/2 SBO s/p dx lap, JOSÉ MIGUEL    NPO/IVF/NGT  Pain/nausea control  OOBA/IS  SQH/SCDs  AM labs

## 2020-04-23 NOTE — CHART NOTE - NSCHARTNOTEFT_GEN_A_CORE
Admitting Diagnosis:   Patient is a 44y old  Female who presents with a chief complaint of abd pain (23 Apr 2020 06:23)    Consult: Yes [   ]  No [ x  ]    Reason for Initial Nutrition Assessment: LOS Assessment    PAST MEDICAL & SURGICAL HISTORY:  No pertinent past medical history  No significant past surgical history    Current Nutrition Order: NPO diet    PO Intake: Good (%) [   ]  Fair (50-75%) [   ] Poor (<25%) [   ]- N/A NPO    GI Issues:   WDL, last BM 4/21 +N likely 2/2 NGT, no v/d/c.    NGT to LIWS (100 ml/24hrs)    Pain:  Denies at this time.     Skin Integrity:  Surgical incision, kavin score 19.     Labs:   04-23    139  |  106  |  4<L>  ----------------------------<  112<H>  3.8   |  23  |  0.67    Ca    8.3<L>      23 Apr 2020 07:20  Phos  2.9     04-23  Mg     2.1     04-23    TPro  7.6  /  Alb  4.4  /  TBili  0.6  /  DBili  x   /  AST  22  /  ALT  28  /  AlkPhos  52  04-21    CAPILLARY BLOOD GLUCOSE    Medications:  MEDICATIONS  (STANDING):  benzocaine 15 mG/menthol 3.6 mG (Sugar-Free) Lozenge 1 Lozenge Oral three times a day  dextrose 5% + sodium chloride 0.45% with potassium chloride 20 mEq/L 1000 milliLiter(s) (125 mL/Hr) IV Continuous <Continuous>  heparin   Injectable 5000 Unit(s) SubCutaneous every 8 hours    MEDICATIONS  (PRN):  HYDROmorphone  Injectable 0.5 milliGRAM(s) IV Push every 4 hours PRN Severe Pain (7 - 10)  HYDROmorphone  Injectable 0.25 milliGRAM(s) IV Push every 4 hours PRN Moderate Pain (4 - 6)  ondansetron Injectable 4 milliGRAM(s) IV Push every 6 hours PRN Nausea and/or Vomiting    Admitted Anthropometrics:  Height: 68" Weight: 115lbs, IBW 140lbs+/-10%, %IBW 82%, BMI 17.6 kg/m2    Weight:  4/22 115lbs    Weight Change:   No new weights obtained this admission. Please reweigh when feasible and trend weights biweekly.     Nutrition Focused Physical Exam: Completed [ x-4/23  ]  Unable to complete [   ]  Unremarkable    Estimated energy needs:   ABW (52kg) used for calculations as pt between % of IBW.   Kcal (25-30 kcal/kg): 1300- 1560 kcal  Protein (1.0-1.2 g/kg pro): 52-62 g pro  Fluids (25-30 ml/kg): 9305-9843 ml    Subjective:   44F with no significant hx admitting with c/o of abd pain found to have SBO. S/p dx lap with JOSÉ MIGUEL (4/22). NGT placed to LIWS (100 ml/24hrs)- pt feeling much better. On assessment, pt resting in bed. NGT limited pt ability to answer questions fully but able to answer yes and no questions. Currently pt remains NPO but asking about when diet will be advanced. Education provided on likely diet progression once NGT is removed- pt receptive. Education on low fiber diet deferred at this time until diet is advanced per pt preferences. Noted good PO intake PTA. UBW consistent with admission weight (115lbs). NKFA. Followed regular diet at home. NKFA unremarkable. Please see nutrition recommendations below. RD to follow up per protocol. Disc with team.     Nutrition Diagnosis: Inadequate energy intake RT inability to meet EER 2/2 SBO AEB NPO diet order    [  ] No active nutrition diagnosis at this time  [  ] Current medical condition precludes nutrition intervention    Goal: 1. Diet will advance [id number] 2. Consistently meet >75% est needs    Recommendations:  1. NPO diet  2. Recommend advance to low fiber diet when medically feasible  3. Cont to manage pain/ nausea per team     Education: Education provided on likely diet progression once NGT is removed- pt receptive. Education on low fiber diet deferred at this time until diet is advanced per pt preferences.    Risk Level: High [ x  ] Moderate [   ] Low [   ]

## 2020-04-23 NOTE — PROGRESS NOTE ADULT - SUBJECTIVE AND OBJECTIVE BOX
INTERVAL HPI/OVERNIGHT EVENTS: OVN POC wnl. Passed tov. This AM    STATUS POST:  diagnostic lap, JOSÉ MIGUEL    POST OPERATIVE DAY #: 1    MEDICATIONS  (STANDING):  dextrose 5% + sodium chloride 0.45% with potassium chloride 20 mEq/L 1000 milliLiter(s) (125 mL/Hr) IV Continuous <Continuous>  heparin   Injectable 5000 Unit(s) SubCutaneous every 8 hours    MEDICATIONS  (PRN):  HYDROmorphone  Injectable 0.5 milliGRAM(s) IV Push every 4 hours PRN Severe Pain (7 - 10)  HYDROmorphone  Injectable 0.25 milliGRAM(s) IV Push every 4 hours PRN Moderate Pain (4 - 6)  ondansetron Injectable 4 milliGRAM(s) IV Push every 6 hours PRN Nausea and/or Vomiting      Vital Signs Last 24 Hrs  T(C): 36.7 (2020 05:35), Max: 37.2 (2020 00:08)  T(F): 98 (2020 05:35), Max: 99 (2020 00:08)  HR: 61 (2020 05:35) (61 - 74)  BP: 108/69 (2020 05:35) (97/60 - 128/84)  BP(mean): 81 (2020 18:00) (66 - 87)  RR: 17 (2020 05:35) (12 - 19)  SpO2: 99% (2020 05:35) (97% - 100%)    PHYSICAL EXAM:      Constitutional: A&Ox3    Respiratory: non labored breathing, no respiratory distress    Cardiovascular: NSR, RRR    Gastrointestinal:                 Incision:    Genitourinary:    Extremities: (-) edema                  I&O's Detail    2020 07:01  -  2020 06:24  --------------------------------------------------------  IN:    dextrose 5% + sodium chloride 0.45% with potassium chloride 20 mEq/L: 1175 mL    IV PiggyBack: 325 mL  Total IN: 1500 mL    OUT:    Emesis: 50 mL    Voided: 2050 mL  Total OUT: 2100 mL    Total NET: -600 mL          LABS:                        13.1   8.69  )-----------( 132      ( 2020 09:37 )             37.1         133<L>  |  102  |  8   ----------------------------<  152<H>  3.7   |  19<L>  |  0.64    Ca    8.4      2020 09:37  Phos  1.8       Mg     1.9         TPro  7.6  /  Alb  4.4  /  TBili  0.6  /  DBili  x   /  AST  22  /  ALT  28  /  AlkPhos  52  04-21    PT/INR - ( 2020 04:13 )   PT: 11.5 sec;   INR: 1.03          PTT - ( 2020 04:13 )  PTT:30.5 sec  Urinalysis Basic - ( 2020 21:10 )    Color: Yellow / Appearance: Clear / S.025 / pH: x  Gluc: x / Ketone: 15 mg/dL  / Bili: NEGATIVE / Urobili: 0.2 E.U./dL   Blood: x / Protein: NEGATIVE mg/dL / Nitrite: NEGATIVE   Leuk Esterase: NEGATIVE / RBC: x / WBC x   Sq Epi: x / Non Sq Epi: x / Bacteria: x        RADIOLOGY & ADDITIONAL STUDIES: INTERVAL HPI/OVERNIGHT EVENTS: OVN POC wnl. Passed tov. This AM pt complains of some throat pain from tube, but is otherwise feeling well. Denies n/v/cp/sob.     STATUS POST:  diagnostic lap, JOSÉ MIGUEL    POST OPERATIVE DAY #: 1    MEDICATIONS  (STANDING):  dextrose 5% + sodium chloride 0.45% with potassium chloride 20 mEq/L 1000 milliLiter(s) (125 mL/Hr) IV Continuous <Continuous>  heparin   Injectable 5000 Unit(s) SubCutaneous every 8 hours    MEDICATIONS  (PRN):  HYDROmorphone  Injectable 0.5 milliGRAM(s) IV Push every 4 hours PRN Severe Pain (7 - 10)  HYDROmorphone  Injectable 0.25 milliGRAM(s) IV Push every 4 hours PRN Moderate Pain (4 - 6)  ondansetron Injectable 4 milliGRAM(s) IV Push every 6 hours PRN Nausea and/or Vomiting      Vital Signs Last 24 Hrs  T(C): 36.7 (2020 05:35), Max: 37.2 (2020 00:08)  T(F): 98 (2020 05:35), Max: 99 (2020 00:08)  HR: 61 (2020 05:35) (61 - 74)  BP: 108/69 (2020 05:35) (97/60 - 128/84)  BP(mean): 81 (2020 18:00) (66 - 87)  RR: 17 (2020 05:35) (12 - 19)  SpO2: 99% (2020 05:35) (97% - 100%)    PHYSICAL EXAM:      Constitutional: A&Ox3    Respiratory: non labored breathing, no respiratory distress    Cardiovascular: NSR, RRR    Gastrointestinal: soft, tender to palpation near incisions, non-distended                 Incision: c/d/i, minimal bruising near incision sites. NGT in place with minimal non-bilious output.     Extremities: (-) edema                  I&O's Detail    2020 07:01  -  2020 06:24  --------------------------------------------------------  IN:    dextrose 5% + sodium chloride 0.45% with potassium chloride 20 mEq/L: 1175 mL    IV PiggyBack: 325 mL  Total IN: 1500 mL    OUT:    Emesis: 50 mL    Voided: 2050 mL  Total OUT: 2100 mL    Total NET: -600 mL          LABS:                        13.1   8.69  )-----------( 132      ( 2020 09:37 )             37.1     04-    133<L>  |  102  |  8   ----------------------------<  152<H>  3.7   |  19<L>  |  0.64    Ca    8.4      2020 09:37  Phos  1.8       Mg     1.9         TPro  7.6  /  Alb  4.4  /  TBili  0.6  /  DBili  x   /  AST  22  /  ALT  28  /  AlkPhos  52  04-21    PT/INR - ( 2020 04:13 )   PT: 11.5 sec;   INR: 1.03          PTT - ( 2020 04:13 )  PTT:30.5 sec  Urinalysis Basic - ( 2020 21:10 )    Color: Yellow / Appearance: Clear / S.025 / pH: x  Gluc: x / Ketone: 15 mg/dL  / Bili: NEGATIVE / Urobili: 0.2 E.U./dL   Blood: x / Protein: NEGATIVE mg/dL / Nitrite: NEGATIVE   Leuk Esterase: NEGATIVE / RBC: x / WBC x   Sq Epi: x / Non Sq Epi: x / Bacteria: x        RADIOLOGY & ADDITIONAL STUDIES:

## 2020-04-24 VITALS
HEART RATE: 57 BPM | OXYGEN SATURATION: 98 % | SYSTOLIC BLOOD PRESSURE: 99 MMHG | TEMPERATURE: 99 F | DIASTOLIC BLOOD PRESSURE: 64 MMHG | RESPIRATION RATE: 17 BRPM

## 2020-04-24 LAB
ANION GAP SERPL CALC-SCNC: 11 MMOL/L — SIGNIFICANT CHANGE UP (ref 5–17)
BUN SERPL-MCNC: 4 MG/DL — LOW (ref 7–23)
CALCIUM SERPL-MCNC: 9 MG/DL — SIGNIFICANT CHANGE UP (ref 8.4–10.5)
CHLORIDE SERPL-SCNC: 107 MMOL/L — SIGNIFICANT CHANGE UP (ref 96–108)
CO2 SERPL-SCNC: 25 MMOL/L — SIGNIFICANT CHANGE UP (ref 22–31)
CREAT SERPL-MCNC: 0.73 MG/DL — SIGNIFICANT CHANGE UP (ref 0.5–1.3)
GLUCOSE SERPL-MCNC: 96 MG/DL — SIGNIFICANT CHANGE UP (ref 70–99)
HCT VFR BLD CALC: 36.7 % — SIGNIFICANT CHANGE UP (ref 34.5–45)
HGB BLD-MCNC: 12.4 G/DL — SIGNIFICANT CHANGE UP (ref 11.5–15.5)
MAGNESIUM SERPL-MCNC: 2.1 MG/DL — SIGNIFICANT CHANGE UP (ref 1.6–2.6)
MCHC RBC-ENTMCNC: 31.2 PG — SIGNIFICANT CHANGE UP (ref 27–34)
MCHC RBC-ENTMCNC: 33.8 GM/DL — SIGNIFICANT CHANGE UP (ref 32–36)
MCV RBC AUTO: 92.4 FL — SIGNIFICANT CHANGE UP (ref 80–100)
NRBC # BLD: 0 /100 WBCS — SIGNIFICANT CHANGE UP (ref 0–0)
PHOSPHATE SERPL-MCNC: 3 MG/DL — SIGNIFICANT CHANGE UP (ref 2.5–4.5)
PLATELET # BLD AUTO: 114 K/UL — LOW (ref 150–400)
POTASSIUM SERPL-MCNC: 4.3 MMOL/L — SIGNIFICANT CHANGE UP (ref 3.5–5.3)
POTASSIUM SERPL-SCNC: 4.3 MMOL/L — SIGNIFICANT CHANGE UP (ref 3.5–5.3)
RBC # BLD: 3.97 M/UL — SIGNIFICANT CHANGE UP (ref 3.8–5.2)
RBC # FLD: 11.8 % — SIGNIFICANT CHANGE UP (ref 10.3–14.5)
SODIUM SERPL-SCNC: 143 MMOL/L — SIGNIFICANT CHANGE UP (ref 135–145)
SURGICAL PATHOLOGY STUDY: SIGNIFICANT CHANGE UP
WBC # BLD: 4.92 K/UL — SIGNIFICANT CHANGE UP (ref 3.8–10.5)
WBC # FLD AUTO: 4.92 K/UL — SIGNIFICANT CHANGE UP (ref 3.8–10.5)

## 2020-04-24 PROCEDURE — 74018 RADEX ABDOMEN 1 VIEW: CPT

## 2020-04-24 PROCEDURE — 85027 COMPLETE CBC AUTOMATED: CPT

## 2020-04-24 PROCEDURE — 84100 ASSAY OF PHOSPHORUS: CPT

## 2020-04-24 PROCEDURE — 87205 SMEAR GRAM STAIN: CPT

## 2020-04-24 PROCEDURE — 36415 COLL VENOUS BLD VENIPUNCTURE: CPT

## 2020-04-24 PROCEDURE — G0378: CPT

## 2020-04-24 PROCEDURE — 85730 THROMBOPLASTIN TIME PARTIAL: CPT

## 2020-04-24 PROCEDURE — 84702 CHORIONIC GONADOTROPIN TEST: CPT

## 2020-04-24 PROCEDURE — 81003 URINALYSIS AUTO W/O SCOPE: CPT

## 2020-04-24 PROCEDURE — 85025 COMPLETE CBC W/AUTO DIFF WBC: CPT

## 2020-04-24 PROCEDURE — 96374 THER/PROPH/DIAG INJ IV PUSH: CPT | Mod: XU

## 2020-04-24 PROCEDURE — 83690 ASSAY OF LIPASE: CPT

## 2020-04-24 PROCEDURE — 80048 BASIC METABOLIC PNL TOTAL CA: CPT

## 2020-04-24 PROCEDURE — 74177 CT ABD & PELVIS W/CONTRAST: CPT

## 2020-04-24 PROCEDURE — 96375 TX/PRO/DX INJ NEW DRUG ADDON: CPT | Mod: XU

## 2020-04-24 PROCEDURE — 76856 US EXAM PELVIC COMPLETE: CPT

## 2020-04-24 PROCEDURE — 93005 ELECTROCARDIOGRAM TRACING: CPT

## 2020-04-24 PROCEDURE — 83605 ASSAY OF LACTIC ACID: CPT

## 2020-04-24 PROCEDURE — 87070 CULTURE OTHR SPECIMN AEROBIC: CPT

## 2020-04-24 PROCEDURE — C9113: CPT

## 2020-04-24 PROCEDURE — 99285 EMERGENCY DEPT VISIT HI MDM: CPT | Mod: 25

## 2020-04-24 PROCEDURE — 88304 TISSUE EXAM BY PATHOLOGIST: CPT

## 2020-04-24 PROCEDURE — 80053 COMPREHEN METABOLIC PANEL: CPT

## 2020-04-24 PROCEDURE — 87635 SARS-COV-2 COVID-19 AMP PRB: CPT

## 2020-04-24 PROCEDURE — 85610 PROTHROMBIN TIME: CPT

## 2020-04-24 PROCEDURE — 86850 RBC ANTIBODY SCREEN: CPT

## 2020-04-24 PROCEDURE — 83735 ASSAY OF MAGNESIUM: CPT

## 2020-04-24 PROCEDURE — 74176 CT ABD & PELVIS W/O CONTRAST: CPT

## 2020-04-24 PROCEDURE — 87075 CULTR BACTERIA EXCEPT BLOOD: CPT

## 2020-04-24 PROCEDURE — 86901 BLOOD TYPING SEROLOGIC RH(D): CPT

## 2020-04-24 PROCEDURE — 76830 TRANSVAGINAL US NON-OB: CPT

## 2020-04-24 RX ORDER — DOCUSATE SODIUM 100 MG
1 CAPSULE ORAL
Qty: 10 | Refills: 0
Start: 2020-04-24 | End: 2020-04-28

## 2020-04-24 RX ADMIN — DEXTROSE MONOHYDRATE, SODIUM CHLORIDE, AND POTASSIUM CHLORIDE 125 MILLILITER(S): 50; .745; 4.5 INJECTION, SOLUTION INTRAVENOUS at 08:54

## 2020-04-24 RX ADMIN — HEPARIN SODIUM 5000 UNIT(S): 5000 INJECTION INTRAVENOUS; SUBCUTANEOUS at 05:16

## 2020-04-24 RX ADMIN — BENZOCAINE AND MENTHOL 1 LOZENGE: 5; 1 LIQUID ORAL at 15:46

## 2020-04-24 RX ADMIN — BENZOCAINE AND MENTHOL 1 LOZENGE: 5; 1 LIQUID ORAL at 05:15

## 2020-04-24 NOTE — DISCHARGE NOTE PROVIDER - CARE PROVIDER_API CALL
Chava Harley)  Surgery  [address] [place], [place] [address]  Phone: [phone]  Fax: [phone]  Follow Up Time:

## 2020-04-24 NOTE — DISCHARGE NOTE PROVIDER - INSTRUCTIONS
Continue a clear liquid diet for the next few days. You may begin to introduce soft foods slowly as tolerated and continue to advance your diet at home.

## 2020-04-24 NOTE — DISCHARGE NOTE PROVIDER - NSDCCPCAREPLAN_GEN_ALL_CORE_FT
PRINCIPAL DISCHARGE DIAGNOSIS  Diagnosis: SBO (small bowel obstruction)  Assessment and Plan of Treatment:       SECONDARY DISCHARGE DIAGNOSES  Diagnosis: Gastritis  Assessment and Plan of Treatment:

## 2020-04-24 NOTE — DISCHARGE NOTE NURSING/CASE MANAGEMENT/SOCIAL WORK - PATIENT PORTAL LINK FT
You can access the FollowMyHealth Patient Portal offered by [facility] by registering at the following website: [email or web address]. By joining [employer]’s FollowMyHealth portal, you will also be able to view your health information using other applications (apps) compatible with our system.

## 2020-04-24 NOTE — DISCHARGE NOTE PROVIDER - HOSPITAL COURSE
This is a 44 year old female with no significant past medical history presents with a complaint of acute onset generalized abdominal pain described as severe cramps x 2 hours, poorly localized with associated nausea and 2 episodes of non-bloody, non-bilious emesis, which occurred 30 minutes after eating eggs with vegetables. She reports no diarrhea/fever/cough/chest pain or dyspnea. No flank pain or urinary symptoms. No vaginal discharge. LMP 1 week ago. Sexually active with 1 partner x 3 years, no hx STDs.        General surgery was consulted for concern of a small bowel obstruction as evidenced on CT scan. The patient was admitted to the general surgery service for further care. On 4/22, she underwent a diagnostic laparoscopic and lysis of adhesions that was uncomplicated. Her postoperative course was unremarkable with removal of her nasogastric tube, advancement of diet, passing trial of void, return of bowel function and pain control. On day of discharge patient was stable to be d/c'd home.

## 2020-04-24 NOTE — PROGRESS NOTE ADULT - ASSESSMENT
44F w/ no PSHx p/w abdominal pain 2/2 SBO s/p dx lap, JOSÉ MIGUEL    NPO w/ sips and chips/IVF  Pain/nausea control  OOBA/IS  SQH/SCDs  AM labs 44F w/ no PSHx p/w abdominal pain 2/2 SBO s/p dx lap, JOSÉ MIGUEL    CLD/IVF  Pain/nausea control  OOBA/IS  SQH/SCDs  AM labs

## 2020-04-24 NOTE — PROGRESS NOTE ADULT - SUBJECTIVE AND OBJECTIVE BOX
INTERVAL HPI/OVERNIGHT EVENTS: NAEO. This AM pt reports passing flatus.     STATUS POST:  diagnostic lap, JOSÉ MIGUEL    POST OPERATIVE DAY #: 2    MEDICATIONS  (STANDING):  benzocaine 15 mG/menthol 3.6 mG (Sugar-Free) Lozenge 1 Lozenge Oral three times a day  dextrose 5% + sodium chloride 0.45% with potassium chloride 20 mEq/L 1000 milliLiter(s) (125 mL/Hr) IV Continuous <Continuous>  heparin   Injectable 5000 Unit(s) SubCutaneous every 8 hours    MEDICATIONS  (PRN):  HYDROmorphone  Injectable 0.5 milliGRAM(s) IV Push every 4 hours PRN Severe Pain (7 - 10)  HYDROmorphone  Injectable 0.25 milliGRAM(s) IV Push every 4 hours PRN Moderate Pain (4 - 6)  ondansetron Injectable 4 milliGRAM(s) IV Push every 6 hours PRN Nausea and/or Vomiting      Vital Signs Last 24 Hrs  T(C): 36.7 (24 Apr 2020 05:27), Max: 37.1 (23 Apr 2020 20:23)  T(F): 98 (24 Apr 2020 05:27), Max: 98.8 (23 Apr 2020 20:23)  HR: 60 (24 Apr 2020 05:27) (60 - 63)  BP: 101/57 (24 Apr 2020 05:27) (101/57 - 109/71)  BP(mean): --  RR: 17 (24 Apr 2020 05:27) (16 - 18)  SpO2: 97% (24 Apr 2020 05:27) (96% - 98%)    PHYSICAL EXAM:      Constitutional: A&Ox3    Respiratory: non labored breathing, no respiratory distress    Cardiovascular: NSR, RRR    Gastrointestinal:                 Incision:    Genitourinary:    Extremities: (-) edema                  I&O's Detail    22 Apr 2020 07:01  -  23 Apr 2020 07:00  --------------------------------------------------------  IN:    dextrose 5% + sodium chloride 0.45% with potassium chloride 20 mEq/L: 1925 mL    IV PiggyBack: 325 mL  Total IN: 2250 mL    OUT:    Emesis: 50 mL    Nasoenteral Tube: 100 mL    Voided: 2050 mL  Total OUT: 2200 mL    Total NET: 50 mL      23 Apr 2020 07:01  -  24 Apr 2020 06:50  --------------------------------------------------------  IN:    dextrose 5% + sodium chloride 0.45% with potassium chloride 20 mEq/L: 1250 mL  Total IN: 1250 mL    OUT:    Voided: 1500 mL  Total OUT: 1500 mL    Total NET: -250 mL          LABS:                        13.0   5.47  )-----------( 122      ( 23 Apr 2020 07:20 )             38.8     04-23    139  |  106  |  4<L>  ----------------------------<  112<H>  3.8   |  23  |  0.67    Ca    8.3<L>      23 Apr 2020 07:20  Phos  2.9     04-23  Mg     2.1     04-23            RADIOLOGY & ADDITIONAL STUDIES: INTERVAL HPI/OVERNIGHT EVENTS: NAEO. This AM pt reports passing flatus. States she is feeling much better, is not in any pain, no n/v/cp/sob.     STATUS POST:  diagnostic lapJOSÉ MIGUEL    POST OPERATIVE DAY #: 2    MEDICATIONS  (STANDING):  benzocaine 15 mG/menthol 3.6 mG (Sugar-Free) Lozenge 1 Lozenge Oral three times a day  dextrose 5% + sodium chloride 0.45% with potassium chloride 20 mEq/L 1000 milliLiter(s) (125 mL/Hr) IV Continuous <Continuous>  heparin   Injectable 5000 Unit(s) SubCutaneous every 8 hours    MEDICATIONS  (PRN):  HYDROmorphone  Injectable 0.5 milliGRAM(s) IV Push every 4 hours PRN Severe Pain (7 - 10)  HYDROmorphone  Injectable 0.25 milliGRAM(s) IV Push every 4 hours PRN Moderate Pain (4 - 6)  ondansetron Injectable 4 milliGRAM(s) IV Push every 6 hours PRN Nausea and/or Vomiting      Vital Signs Last 24 Hrs  T(C): 36.7 (24 Apr 2020 05:27), Max: 37.1 (23 Apr 2020 20:23)  T(F): 98 (24 Apr 2020 05:27), Max: 98.8 (23 Apr 2020 20:23)  HR: 60 (24 Apr 2020 05:27) (60 - 63)  BP: 101/57 (24 Apr 2020 05:27) (101/57 - 109/71)  BP(mean): --  RR: 17 (24 Apr 2020 05:27) (16 - 18)  SpO2: 97% (24 Apr 2020 05:27) (96% - 98%)    PHYSICAL EXAM:      Constitutional: A&Ox3    Respiratory: non labored breathing, no respiratory distress    Cardiovascular: NSR, RRR    Gastrointestinal: soft, non-tender, non-distended                 Incision: c/d/i      Extremities: (-) edema                  I&O's Detail    22 Apr 2020 07:01  -  23 Apr 2020 07:00  --------------------------------------------------------  IN:    dextrose 5% + sodium chloride 0.45% with potassium chloride 20 mEq/L: 1925 mL    IV PiggyBack: 325 mL  Total IN: 2250 mL    OUT:    Emesis: 50 mL    Nasoenteral Tube: 100 mL    Voided: 2050 mL  Total OUT: 2200 mL    Total NET: 50 mL      23 Apr 2020 07:01  -  24 Apr 2020 06:50  --------------------------------------------------------  IN:    dextrose 5% + sodium chloride 0.45% with potassium chloride 20 mEq/L: 1250 mL  Total IN: 1250 mL    OUT:    Voided: 1500 mL  Total OUT: 1500 mL    Total NET: -250 mL          LABS:                        13.0   5.47  )-----------( 122      ( 23 Apr 2020 07:20 )             38.8     04-23    139  |  106  |  4<L>  ----------------------------<  112<H>  3.8   |  23  |  0.67    Ca    8.3<L>      23 Apr 2020 07:20  Phos  2.9     04-23  Mg     2.1     04-23            RADIOLOGY & ADDITIONAL STUDIES:

## 2020-04-24 NOTE — DISCHARGE NOTE PROVIDER - NSDCFUADDINST_GEN_ALL_CORE_FT
You may take Tylenol/Advil as needed for pain.     General Discharge Instructions:  Please resume all regular home medications unless specifically advised not to take a particular medication. Also, please take any new medications as prescribed.  Please follow-up with your surgeon and Primary Care Provider (PCP) as advised. You may take Tylenol/Advil as needed for pain. If pain is severe, you may take one Percocet every 6 hours as needed. If taking Percocet, please take the Colace for constipation that may result.     General Discharge Instructions:  Please resume all regular home medications unless specifically advised not to take a particular medication. Also, please take any new medications as prescribed.  Please follow-up with your surgeon and Primary Care Provider (PCP) as advised.

## 2020-04-24 NOTE — DISCHARGE NOTE PROVIDER - NSDCMRMEDTOKEN_GEN_ALL_CORE_FT
Colace 100 mg oral capsule: 1 cap(s) orally 2 times a day, As Needed -for constipation   oxycodone-acetaminophen 5 mg-325 mg oral tablet: 1 tab(s) orally every 6 hours, As Needed -for severe pain MDD:4 tabs

## 2020-04-27 DIAGNOSIS — K29.70 GASTRITIS, UNSPECIFIED, WITHOUT BLEEDING: ICD-10-CM

## 2020-04-27 DIAGNOSIS — R18.8 OTHER ASCITES: ICD-10-CM

## 2020-04-27 DIAGNOSIS — K56.50 INTESTINAL ADHESIONS [BANDS], UNSPECIFIED AS TO PARTIAL VERSUS COMPLETE OBSTRUCTION: ICD-10-CM

## 2020-04-27 DIAGNOSIS — R63.6 UNDERWEIGHT: ICD-10-CM

## 2020-04-27 DIAGNOSIS — R10.9 UNSPECIFIED ABDOMINAL PAIN: ICD-10-CM

## 2020-04-27 LAB
CULTURE RESULTS: NO GROWTH — SIGNIFICANT CHANGE UP
SPECIMEN SOURCE: SIGNIFICANT CHANGE UP

## 2023-03-21 ENCOUNTER — RX ONLY (RX ONLY)
Age: 48
End: 2023-03-21

## 2023-03-21 ENCOUNTER — OFFICE (OUTPATIENT)
Dept: URBAN - METROPOLITAN AREA CLINIC 28 | Facility: CLINIC | Age: 48
Setting detail: OPHTHALMOLOGY
End: 2023-03-21
Payer: COMMERCIAL

## 2023-03-21 DIAGNOSIS — H16.223: ICD-10-CM

## 2023-03-21 DIAGNOSIS — H43.813: ICD-10-CM

## 2023-03-21 DIAGNOSIS — H25.13: ICD-10-CM

## 2023-03-21 DIAGNOSIS — H44.23: ICD-10-CM

## 2023-03-21 PROCEDURE — 92133 CPTRZD OPH DX IMG PST SGM ON: CPT | Performed by: SPECIALIST

## 2023-03-21 PROCEDURE — 92250 FUNDUS PHOTOGRAPHY W/I&R: CPT | Performed by: SPECIALIST

## 2023-03-21 PROCEDURE — 92004 COMPRE OPH EXAM NEW PT 1/>: CPT | Performed by: SPECIALIST

## 2023-03-21 ASSESSMENT — SUPERFICIAL PUNCTATE KERATITIS (SPK)
OS_SPK: 1+
OD_SPK: 1+ 2+

## 2023-03-21 ASSESSMENT — CONFRONTATIONAL VISUAL FIELD TEST (CVF)
OS_FINDINGS: FULL
OD_FINDINGS: FULL

## 2023-03-21 ASSESSMENT — TONOMETRY
OD_IOP_MMHG: 19
OS_IOP_MMHG: 20

## 2023-03-22 ASSESSMENT — REFRACTION_CURRENTRX
OS_VPRISM_DIRECTION: SV
OS_CYLINDER: SPH
OS_SPHERE: -8.00
OD_AXIS: 021
OS_OVR_VA: 20/
OD_CYLINDER: -0.50
OD_SPHERE: -8.25
OD_OVR_VA: 20/
OD_VPRISM_DIRECTION: SV

## 2023-03-22 ASSESSMENT — REFRACTION_AUTOREFRACTION
OS_CYLINDER: -0.50
OD_AXIS: 032
OD_CYLINDER: +0.25
OS_SPHERE: -8.75
OS_AXIS: 165

## 2023-03-22 ASSESSMENT — VISUAL ACUITY
OS_BCVA: 20/50-2
OD_BCVA: 20/20-2

## 2023-03-22 ASSESSMENT — KERATOMETRY
OD_AXISANGLE_DEGREES: 110
OS_AXISANGLE_DEGREES: 069
OS_K2POWER_DIOPTERS: 43.00
OD_K1POWER_DIOPTERS: 42.50
METHOD_AUTO_MANUAL: AUTO
OS_K1POWER_DIOPTERS: 42.50
OD_K2POWER_DIOPTERS: 43.25

## 2023-03-22 ASSESSMENT — SPHEQUIV_DERIVED: OS_SPHEQUIV: -9

## 2023-03-22 ASSESSMENT — AXIALLENGTH_DERIVED: OS_AL: 28.0958
